# Patient Record
Sex: MALE | Race: WHITE | NOT HISPANIC OR LATINO
[De-identification: names, ages, dates, MRNs, and addresses within clinical notes are randomized per-mention and may not be internally consistent; named-entity substitution may affect disease eponyms.]

---

## 2018-04-30 ENCOUNTER — APPOINTMENT (OUTPATIENT)
Dept: ORTHOPEDIC SURGERY | Facility: CLINIC | Age: 53
End: 2018-04-30
Payer: COMMERCIAL

## 2018-04-30 PROCEDURE — 99214 OFFICE O/P EST MOD 30 MIN: CPT

## 2018-04-30 PROCEDURE — 73564 X-RAY EXAM KNEE 4 OR MORE: CPT | Mod: LT

## 2019-01-23 ENCOUNTER — APPOINTMENT (OUTPATIENT)
Dept: ORTHOPEDIC SURGERY | Facility: CLINIC | Age: 54
End: 2019-01-23
Payer: COMMERCIAL

## 2019-01-23 VITALS — WEIGHT: 196 LBS | BODY MASS INDEX: 27.44 KG/M2 | HEIGHT: 71 IN

## 2019-01-23 PROCEDURE — 99214 OFFICE O/P EST MOD 30 MIN: CPT

## 2019-01-23 PROCEDURE — 73564 X-RAY EXAM KNEE 4 OR MORE: CPT | Mod: RT

## 2019-01-23 RX ORDER — HYALURONATE SODIUM 20 MG/2 ML
20 SYRINGE (ML) INTRAARTICULAR
Qty: 1 | Refills: 0 | Status: ACTIVE | OUTPATIENT
Start: 2019-01-23

## 2019-01-24 NOTE — PHYSICAL EXAM
[de-identified] : Oriented to time, place, person\par Mood: Normal\par Affect: Normal\par  \par Right knee exam\par \par Skin: Clean, dry, intact\par Inspection: No obvious malalignment, no masses, min swelling, no effusion\par Pulses: 2+ DP/PT pulses\par ROM: 0-135 degrees of flexion. No pain with deep knee flexion/extension.\par Tenderness: mild MJLT. No LJLT. Mild pain over the inferior patella facets. No pain to the quadriceps tendon. No pain to the patella tendon. No posterior knee tenderness.\par Stability: Stable to varus, valgus. Negative lachman testing. Negative anterior drawer, negative posterior drawer.\par Strength: 5/5 Q/H/TA/GS/EHL, without atrophy\par Neuro: In tact to light touch throughout, DTR's normal\par Additional tests: Negative McMurrays test, Negative patellar grind test.  [de-identified] : The following radiographs were ordered and read by me during this patients visit. I reviewed each radiograph in detail with the patient and discussed the findings as highlighted below. \par \par 4 views of the right knee were obtained today that show no acute fracture or dislocation. There is mild medial, no lateral and mild patellofemoral degenerative change seen. There is no significant malalignment. No significant other obvious osseous abnormality, otherwise unremarkable. \par \par

## 2019-01-24 NOTE — DISCUSSION/SUMMARY
[de-identified] : 54-year-old male with right knee pain\par \par Patient continues to have degenerative knee pain. Good effect with Visco supplementation. Symptoms mostly localized through the patellofemoral compartment where there is moderate degenerative chondral wear as previously seen on MRI/x-ray. Discussion was had with patient regarding continued activity modification as well as continued trial of HA injection.\par \par Recommendation: Trial of Visco supplementation. We'll obtain preauthorization prior to injection therapy.\par \par Followup: Once approved for injection therapy.\par \par \par **NB: Medication was Issued under circumstances where the provider (Dr. Milton Kenny) reasonably determined that it would be impractical for the patient to obtain substances prescribed by electronic prescription (e-prescribe) given need for pre-authorization, and such delay would adversely impact the patient's medical condition. An exception letter will be mailed to the [facility] during the authorization process.**

## 2019-01-24 NOTE — HISTORY OF PRESENT ILLNESS
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 He was last seen in October 2016 at which time he received viscosupplementation. Injections gave him significant relief for 2 years. He would like to repeat the series if able.  The pain is intermittent brought on twisting movements. He is taking Aleve for pain. MRI obtained in 2015 that showed PF OA and small cystic change to the anterior meniscus.

## 2019-02-04 ENCOUNTER — APPOINTMENT (OUTPATIENT)
Dept: ORTHOPEDIC SURGERY | Facility: CLINIC | Age: 54
End: 2019-02-04
Payer: COMMERCIAL

## 2019-02-04 PROCEDURE — 20610 DRAIN/INJ JOINT/BURSA W/O US: CPT | Mod: RT

## 2019-02-05 NOTE — END OF VISIT
[FreeTextEntry3] : 54-year-old male with right knee osteoarthritis\par \par The first of three Euflexxa injections was given today under sterile conditions into the right knee joint without complication. The patient was instructed on modification of activities over the next 48-72 hours. I advised the patient to ice the knee as needed for control of local irritation from the injection. I advised that some patients have immediate benefit from the initial injection therapy, however it usually takes the medication a number of weeks (~8wks) to provide significant relief of osteoarthritic symptoms. \par \par We will see the patient back for the second injection in a weeks time.

## 2019-02-05 NOTE — PROCEDURE
[de-identified] : Injection: Right knee joint.\par Indication: Osteoarthritis.\par \par A discussion was had with the patient regarding this procedure and all questions were answered. All risks, benefits and alternatives were discussed. These included but were not limited to bleeding, infection, and allergic reaction. Alcohol was used to clean the skin, and betadine was used to sterilize and prep the area in the supero-lateral aspect of the right knee. Ethyl chloride spray was then used as a topical anesthetic. A 21-gauge needle was used to inject 2 cc of Euflexxa into the knee. A sterile bandage was then applied. The patient tolerated the procedure well and there were no complications. \par \par

## 2019-02-11 ENCOUNTER — APPOINTMENT (OUTPATIENT)
Dept: ORTHOPEDIC SURGERY | Facility: CLINIC | Age: 54
End: 2019-02-11
Payer: COMMERCIAL

## 2019-02-11 PROCEDURE — 20610 DRAIN/INJ JOINT/BURSA W/O US: CPT | Mod: RT

## 2019-02-26 ENCOUNTER — APPOINTMENT (OUTPATIENT)
Dept: ORTHOPEDIC SURGERY | Facility: CLINIC | Age: 54
End: 2019-02-26
Payer: COMMERCIAL

## 2019-02-26 PROCEDURE — 20610 DRAIN/INJ JOINT/BURSA W/O US: CPT | Mod: RT

## 2019-02-26 NOTE — PROCEDURE
[de-identified] : Injection & aspiration: Right knee joint.\par Indication: Osteoarthritis.\par \par A discussion was had with the patient regarding this procedure and all questions were answered. All risks, benefits and alternatives were discussed. These included but were not limited to bleeding, infection, and allergic reaction. Alcohol was used to clean the skin, and betadine was used to sterilize and prep the area in the supero-lateral aspect of the right knee. Ethyl chloride spray was then used as a topical anesthetic. An 18-gauge needle was used to inject 2 cc of Euflexxa into the knee and aspirate 15cc inflammatory fluid. A sterile bandage was then applied. The patient tolerated the procedure well and there were no complications. \par \par

## 2019-02-26 NOTE — END OF VISIT
[FreeTextEntry3] : 54-year-old male with right knee osteoarthritis\par \par The final Euflexxa injection was given today under sterile conditions into the right knee joint without complication. I discussed the effects of this medication and how long it may provide benefit. Patient has obtained minimal immediate improvement. If no significant long-term benefit, the patient may elect for additional treatment strategies as previously discussed. However, if the patient obtains good relief of symptoms, the injection therapy series can be repeated over the next 6-12 months.\par \par We'll have the patient followup on an as-needed basis at this time.

## 2019-02-27 NOTE — END OF VISIT
[FreeTextEntry3] : 54-year-old male with right knee osteoarthritis\par \par The second of three Euflexxa injections was given today under sterile conditions into the right knee joint without complication. I again discussed the role of activity modification/icing following the injection to treat any local irritation from the injection. \par \par We'll have the patient followup for the final injection next week.

## 2019-06-20 ENCOUNTER — APPOINTMENT (OUTPATIENT)
Dept: ORTHOPEDIC SURGERY | Facility: CLINIC | Age: 54
End: 2019-06-20
Payer: COMMERCIAL

## 2019-06-20 PROCEDURE — 99213 OFFICE O/P EST LOW 20 MIN: CPT

## 2019-06-24 ENCOUNTER — OTHER (OUTPATIENT)
Age: 54
End: 2019-06-24

## 2019-06-28 NOTE — HISTORY OF PRESENT ILLNESS
[All Other ROS Normal] : All other review of systems are negative except as noted [Joint Pain] : joint pain [Past Hx] : past medical [Fam Hx] : family [Soc Hx] : social [All] : medication and allergy [FreeTextEntry2] : 53 year old male presents today with return of left knee pain x 1 month. He was treated with PT in 2018 which was helpful. HA injections in 2015/2016. He reports that has gotten worse within the past week. The pain is constant worse with knee flexion. Denies locking, catching, buckling, numbness or tingling. Localizes pain to the lateral aspect of the knee.  [FreeTextEntry1] : Left knee pain

## 2019-06-28 NOTE — PHYSICAL EXAM
[Poor Appearance] : well-appearing [Acute Distress] : not in acute distress [Obese] : not obese [FreeTextEntry2] : Left knee exam\par \par Skin: Clean, dry, intact\par Inspection: No obvious malalignment, no masses, no swelling, no effusion\par Pulses: 2+ DP/PT pulses\par ROM: 0-130 degrees of flexion. Min pain with deep knee flexion.\par Tenderness: Mild MJLT. + LJLT. No pain over the patella facets. No pain to the quadriceps tendon. No pain to the patella tendon. No posterior knee tenderness.\par Stability: Stable to varus, valgus. Negative lachman testing. Negative anterior drawer, negative posterior drawer.\par Strength: 5/5 Q/H/TA/GS/EHL, without atrophy\par Neuro: In tact to light touch throughout, DTR's normal\par Additional tests: + McMurrays test, Negative patellar grind test.\par  [Antalgic] : antalgic [de-identified] : 4 views of the left knee were obtained 4.30.18 that show no acute fracture or dislocation. There is min medial, no lateral and min patellofemoral degenerative change seen. There is no significant malalignment. No significant other obvious osseous abnormality, otherwise unremarkable.

## 2019-06-28 NOTE — DISCUSSION/SUMMARY
[de-identified] : 53 year old male presents with left knee pain \par \par Continued pain through the lateral joint, but also with persistent pain medially. Prior treatment has been for degenerative meniscus tear as well as early patellofemoral arthrosis. Considering persistent symptoms, recommendation at this time is for MRI imaging to stratify him for further treatment.\par \par Patient voiced understanding. Follow up after MRI.

## 2020-05-21 ENCOUNTER — APPOINTMENT (OUTPATIENT)
Dept: ORTHOPEDIC SURGERY | Facility: CLINIC | Age: 55
End: 2020-05-21
Payer: COMMERCIAL

## 2020-05-21 VITALS — TEMPERATURE: 96.8 F

## 2020-05-21 PROCEDURE — 73030 X-RAY EXAM OF SHOULDER: CPT | Mod: LT

## 2020-05-21 PROCEDURE — 99214 OFFICE O/P EST MOD 30 MIN: CPT

## 2020-05-21 NOTE — PHYSICAL EXAM
[de-identified] : ·	Oriented to time, place, person\par ·	Mood: Normal\par ·	Affect: Normal\par ·	Appearance: Healthy, well appearing, no acute distress.\par ·	Gait: Normal\par ·	Assistive Devices: None\par \par Left shoulder exam\par \par ·	Inspection: No malalignment, No defects, No atrophy\par ·	Skin: No masses, No lesions\par ·	Neck: Negative Spurlings, full ROM, no pain with ROM\par ·	AROM: FF to 180, abduction to 90, ER to 70, IR to mid lumbar\par ·	PROM: Full \par ·	Painful arc ROM: None\par ·	Tenderness: No bicipital tenderness, no tenderness to the greater tuberosity/RTC insertion, no anterior shoulder/lesser tuberosity tenderness\par ·	Strength: 5/5 ER, 5/5 IR in adduction, 5/5 supraspinatus testing, mild O'Briens test\par ·	AC Joint: No ttp/pain with cross arm testing\par ·	Biceps: Speed Negative, Yergusons Negative\par ·	Impingement test: Positive Reynoso, Positive Neer \par ·	Stability: Stable \par ·	Vasc: 2+ radial pulse\par ·	Neuro: AIN, PIN, Ulnar nerve in tact to motor\par ·	Sensation: In tact to light touch throughout\par  [de-identified] : \par The following radiographs were ordered and read by me during this patients visit. I reviewed each radiograph in detail with the patient and discussed the findings as highlighted below. \par \par 3 views of the left shoulder were obtained today that show no acute fracture or dislocation. There is no glenohumeral and no AC joint degenerative change seen. Type II acromion. There is no significant malalignment. No significant other obvious osseous abnormality, otherwise unremarkable.

## 2020-05-21 NOTE — HISTORY OF PRESENT ILLNESS
[de-identified] : 55 year old male presents today with left shoulder pain x 1 month. No injury reported. The pain is constant worse with overhead motion and internal rotation. Taking Tylenol/ Aleve.  Patient has some difficulty sleeping, and reports weakness in certain positions.  Denies radiation of pain, numbness or tingling. Previously been treated for left knee pain

## 2020-08-12 ENCOUNTER — APPOINTMENT (OUTPATIENT)
Dept: ORTHOPEDIC SURGERY | Facility: CLINIC | Age: 55
End: 2020-08-12
Payer: COMMERCIAL

## 2020-08-12 VITALS — TEMPERATURE: 97.4 F

## 2020-08-12 DIAGNOSIS — M25.512 PAIN IN LEFT SHOULDER: ICD-10-CM

## 2020-08-12 PROCEDURE — 99214 OFFICE O/P EST MOD 30 MIN: CPT

## 2020-08-13 PROBLEM — M25.512 ACUTE PAIN OF LEFT SHOULDER: Status: ACTIVE | Noted: 2020-05-21

## 2020-08-24 ENCOUNTER — NON-APPOINTMENT (OUTPATIENT)
Age: 55
End: 2020-08-24

## 2022-12-15 ENCOUNTER — APPOINTMENT (OUTPATIENT)
Dept: ORTHOPEDIC SURGERY | Facility: CLINIC | Age: 57
End: 2022-12-15
Payer: COMMERCIAL

## 2022-12-15 VITALS
SYSTOLIC BLOOD PRESSURE: 123 MMHG | OXYGEN SATURATION: 98 % | HEIGHT: 70 IN | WEIGHT: 299 LBS | HEART RATE: 78 BPM | BODY MASS INDEX: 42.8 KG/M2 | DIASTOLIC BLOOD PRESSURE: 80 MMHG

## 2022-12-15 DIAGNOSIS — M25.562 PAIN IN LEFT KNEE: ICD-10-CM

## 2022-12-15 PROCEDURE — 99214 OFFICE O/P EST MOD 30 MIN: CPT

## 2022-12-15 PROCEDURE — 73564 X-RAY EXAM KNEE 4 OR MORE: CPT | Mod: LT

## 2022-12-15 RX ORDER — HYALURONATE SODIUM 20 MG/2 ML
20 SYRINGE (ML) INTRAARTICULAR
Qty: 1 | Refills: 0 | Status: ACTIVE | COMMUNITY
Start: 2022-12-15

## 2023-01-03 PROBLEM — M25.562 LEFT KNEE PAIN: Status: ACTIVE | Noted: 2018-05-01

## 2023-01-03 NOTE — HISTORY OF PRESENT ILLNESS
[All Other ROS Normal] : All other review of systems are negative except as noted [Joint Pain] : joint pain [Past Hx] : past medical [Fam Hx] : family [Soc Hx] : social [All] : medication and allergy [FreeTextEntry1] : Left knee pain  [FreeTextEntry2] : 53 year old male presents today with return of left knee pain x  3 weeks. MRI left knee dated 6.26.19 shows no evidence of meniscal injury. Pericruciate ganglion cyst measuring 2cm.  The pain is constant worse with walking. Motrin/ Aleve is minimally helpful.   Denies locking, catching, buckling, numbness or tingling. C/o buckling. He was conservative when he was seen in 2019.   [de-identified] : 57 year old male presents today with return of left knee pain x 3 weeks. MRI left knee in 2019 showed pericruciate ganglion cyst measuring 2cm.  Current pain is constant worse with walking. Motrin/ Aleve is minimally helpful.   Denies locking, catching, buckling, numbness or tingling. C/o buckling. Prior HA injections to the right knee and has continued to have good relief.

## 2023-01-03 NOTE — ADDENDUM
[FreeTextEntry1] : This note was written by Daisy Everett on 12/15/2022 acting solely as a scribe for Dr. Milton Kenny.\par \par All medical record entries made by the Scribe were at my, Dr. Milton Kenny, direction and personally dictated by me on 12/15/2022. I have personally reviewed the chart and agree that the record accurately reflects my personal performance of the history, physical exam, assessment and plan.

## 2023-01-03 NOTE — PHYSICAL EXAM
[Antalgic] : antalgic [Poor Appearance] : well-appearing [Acute Distress] : not in acute distress [Obese] : not obese [FreeTextEntry2] : Left knee exam\par \par Skin: Clean, dry, intact\par Inspection: No obvious malalignment, no masses, no swelling, no effusion\par Pulses: 2+ DP/PT pulses\par ROM: 0-130 degrees of flexion. Min pain with deep knee flexion.\par Tenderness: Mild MJLT. + LJLT. No pain over the patella facets. No pain to the quadriceps tendon. No pain to the patella tendon. No posterior knee tenderness.\par Stability: Stable to varus, valgus. Negative lachman testing. Negative anterior drawer, negative posterior drawer.\par Strength: 5/5 Q/H/TA/GS/EHL, without atrophy\par Neuro: In tact to light touch throughout, DTR's normal\par Additional tests: + McMurrays test, Negative patellar grind test.\par  [de-identified] : Oriented to time, place, person\par Mood: Normal\par Affect: Normal\par Appearance: Healthy, well appearing, no acute distress.\par Gait: Normal\par Assistive Devices: None \par \par Left knee exam\par \par Skin: Clean, dry, intact\par Inspection: No obvious malalignment, no masses, no swelling, no effusion\par Pulses: 2+ DP/PT pulses\par ROM: 0-130 degrees of flexion. Min pain with deep knee flexion.\par Tenderness: + MJLT. no LJLT. No pain over the patella facets. No pain to the quadriceps tendon. No pain to the patella tendon. No posterior knee tenderness.\par Stability: Stable to varus, valgus. Negative lachman testing. Negative anterior drawer, negative posterior drawer.\par Strength: 5/5 Q/H/TA/GS/EHL, without atrophy\par Neuro: In tact to light touch throughout, DTR's normal\par Additional tests: + McMurrays test, Negative patellar grind test. [de-identified] : The following radiographs were ordered and read by me during this patients visit. I reviewed each radiograph in detail with the patient and discussed the findings as highlighted below. \par \par 4 views of the left knee were obtained 12.15.22 that show no acute fracture or dislocation. There is min medial, no lateral and min patellofemoral degenerative change seen. There is no significant malalignment. No significant other obvious osseous abnormality, otherwise unremarkable.\par \par MRI left knee dated 6.26.19 shows no evidence of meniscal injury. Pericruciate ganglion cyst measuring 2cm.

## 2023-01-03 NOTE — DISCUSSION/SUMMARY
[de-identified] : 57 year old male presents with left knee pain \par \par Patient presents with return of left knee pain. Prior treatment has been for evidence of pericruciate ganglion cyst in 2019. We discussed consideration of treatemnt for early DJD. He has had goof relief with viscosupplementation to the right knee and would like to trial on the left knee.\par \par Recommend trial of Visco supplementation injection therapy. We'll obtain preauthorization prior to injection. Patient is not a candidate for intra-articular steroid injection or arthroplasty at this time. \par \par Followup: Once approved for HA injection therapy.

## 2023-01-16 ENCOUNTER — APPOINTMENT (OUTPATIENT)
Dept: ORTHOPEDIC SURGERY | Facility: CLINIC | Age: 58
End: 2023-01-16
Payer: COMMERCIAL

## 2023-01-16 VITALS — WEIGHT: 203 LBS | HEIGHT: 70 IN | BODY MASS INDEX: 29.06 KG/M2

## 2023-01-16 DIAGNOSIS — G89.29 PAIN IN LEFT KNEE: ICD-10-CM

## 2023-01-16 DIAGNOSIS — M25.562 PAIN IN LEFT KNEE: ICD-10-CM

## 2023-01-16 DIAGNOSIS — Z78.9 OTHER SPECIFIED HEALTH STATUS: ICD-10-CM

## 2023-01-16 DIAGNOSIS — M67.40 GANGLION, UNSPECIFIED SITE: ICD-10-CM

## 2023-01-16 PROCEDURE — 99203 OFFICE O/P NEW LOW 30 MIN: CPT

## 2023-01-16 NOTE — DISCUSSION/SUMMARY
[de-identified] : General Dx Discussion\par The patient was advised of the diagnosis. The natural history of the pathology was explained in full to the patient in layman's terms. All questions were answered. The risks and benefits of surgical and non-surgical treatment alternatives were explained in full to the patient.\par \par mri reviewed from 2019\par \par will get a new mri to eval for tear or enlargement of cyst \par f/u after mri \par questions answered

## 2023-01-16 NOTE — REASON FOR VISIT
[FreeTextEntry2] : New Patient:Left knee\par reports pain s/p mri in 2019\par has already seen an orthopedist where xray was done  who has rec. viscosupp.

## 2023-01-16 NOTE — HISTORY OF PRESENT ILLNESS
[8] : 8 [2] : 2 [Dull/Aching] : dull/aching [Sharp] : sharp [Constant] : constant [Household chores] : household chores [Leisure] : leisure [Rest] : rest [Meds] : meds [Walking] : walking [de-identified] : Left knee [] : no [FreeTextEntry1] : Left knee [FreeTextEntry5] : EDGARD COVARRUBIAS is a 58 year old M here today for an evaluation of the left knee. Pt states that he was told by his regular orthopedic doctor that he had bone on bone in his left knee like he's had in his right knee prior. Pt received Euflexxa injections for the right knee in 2019 and has been pain free since; pt was recommended to seek Euflexxa in his left. He was told that his insurance rejected auth for Euflexxa however. Pain varies at rest.  [FreeTextEntry9] : Diclofenac  [de-identified] : Standing from sitting [de-identified] : 12/15/22 [de-identified] : Dr. Kenny

## 2023-01-16 NOTE — PHYSICAL EXAM
[Left] : left knee [NL (0)] : extension 0 degrees [5___] : hamstring 5[unfilled]/5 [Negative] : negative Sole's [] : no calf tenderness [TWNoteComboBox7] : flexion 125 degrees

## 2023-01-17 ENCOUNTER — APPOINTMENT (OUTPATIENT)
Dept: MRI IMAGING | Facility: CLINIC | Age: 58
End: 2023-01-17

## 2023-01-18 ENCOUNTER — FORM ENCOUNTER (OUTPATIENT)
Age: 58
End: 2023-01-18

## 2023-01-19 ENCOUNTER — APPOINTMENT (OUTPATIENT)
Dept: MRI IMAGING | Facility: CLINIC | Age: 58
End: 2023-01-19
Payer: COMMERCIAL

## 2023-01-19 PROCEDURE — 73721 MRI JNT OF LWR EXTRE W/O DYE: CPT | Mod: LT

## 2023-01-23 ENCOUNTER — APPOINTMENT (OUTPATIENT)
Dept: ORTHOPEDIC SURGERY | Facility: CLINIC | Age: 58
End: 2023-01-23
Payer: COMMERCIAL

## 2023-01-23 VITALS — WEIGHT: 203 LBS | HEIGHT: 70 IN | BODY MASS INDEX: 29.06 KG/M2

## 2023-01-23 DIAGNOSIS — M23.92 UNSPECIFIED INTERNAL DERANGEMENT OF LEFT KNEE: ICD-10-CM

## 2023-01-23 PROCEDURE — 99214 OFFICE O/P EST MOD 30 MIN: CPT

## 2023-01-23 NOTE — PHYSICAL EXAM
[Left] : left knee [NL (0)] : extension 0 degrees [5___] : hamstring 5[unfilled]/5 [Positive] : positive Aliya [] : no calf tenderness [TWNoteComboBox7] : flexion 110 degrees

## 2023-01-23 NOTE — HISTORY OF PRESENT ILLNESS
[7] : 7 [4] : 4 [Dull/Aching] : dull/aching [Sharp] : sharp [Intermittent] : intermittent [Household chores] : household chores [Leisure] : leisure [Work] : work [Sleep] : sleep [Walking] : walking [Full time] : Work status: full time [de-identified] : Patient is here for MRI results of his left knee.  [] : no [FreeTextEntry1] : left knee [FreeTextEntry9] : voltaren gel [de-identified] : getting up from a sitting position [de-identified] : none

## 2023-01-23 NOTE — DATA REVIEWED
[MRI] : MRI [Left] : left [Report was reviewed and noted in the chart] : The report was reviewed and noted in the chart [I reviewed the films/CD] : I reviewed the films/CD [FreeTextEntry1] : mm tear / OA

## 2023-01-23 NOTE — DISCUSSION/SUMMARY
[de-identified] : General Dx Discussion\par The patient was advised of the diagnosis. The natural history of the pathology was explained in full to the patient in layman's terms. All questions were answered. The risks and benefits of surgical and non-surgical treatment alternatives were explained in full to the patient.\par \par Case Discussed.\par MRI reviewed, has occ. catching with pain and loss of motion \par Arthroscopy with partial medial / lateral; meniscectomy and debridement / synovectomy discussed\par Risks, benefits and alternatives discussed. Risks include, but are not limited to infection, blood clot, nerve damage, recurrent tear, loss of motion, continued pain, worsened pain, need for another surgery in the future.\par Discussed that the surgery will not address any pain that he has from any OA he may have. He understands he will not be 100% better after surgery.\par Questions answered.\par He expressed understanding and would like to proceed.\par Will also get auth for euflexxa lt knee if not auth by his insurance, will get Visco3 via [facility]. \par \par The patient has two pathologic conditions at this point.  There is a meniscus tear which is causing symptomology and there is also underlying osteoarthritis.  The patient was counseled that surgical intervention to address the torn meniscus will not change the symptomology attributable to the arthritis.  The patient was advised that the pain attributable to the meniscus tear should be resolved arthroscopically.  However, the patient should expect to have continued aches and pains related to the arthritis, in the form of, but not limited to pain, weather related changes, dull ache, crepitation, limitation of motion and strength and the need for further surgeries. The patient understands that the arthroscopic procedure addresses the meniscus only and that after surgery, the knee will not be 100% but it should be improved with respect to the symptomology attributed to the torn meniscus.  Patient also is aware that further treatment after arthroscopy may be necessary in the form of oral medications, cortisone and/or viscosupplementation injections, and further surgeries including knee replacement.  The patient agrees, understands and wishes to proceed.\par \par The patient was advised of the diagnosis.  The natural history of the pathology was explained in full to the patient in layman's terms. All questions were answered.  The risks and benefits of surgical and non-surgical treatment alternatives were explained in full to the patient.  \par The patient demonstrated a full understanding of the surgical and non-surgical options.  The risks of surgery were outlined in full to the patient including but not limited to bleeding, scarring, infection, sepsis, neurologic injury, vascular injury, failure to resolve symptoms, symptom recurrence, the need for further surgery, non-healing, wound breakdown, deep vein thrombosis, pulmonary embolism, spontaneous osteonecrosis, anesthesia complications and even death.  The patient understood all the risks and accepted them and understood that other complications could occur that are not mentioned above.  The intraoperative plan, post-operative plan, post-operative expectations and limitations were explained in full.  Expectations from non-surgical treatment were explained in full as well.  The patient demonstrated a complete understanding of the treatment alternatives and requested the above-mentioned procedure.  This will be scheduled accordingly.\par \par \par \par

## 2023-01-26 ENCOUNTER — APPOINTMENT (OUTPATIENT)
Dept: ORTHOPEDIC SURGERY | Facility: CLINIC | Age: 58
End: 2023-01-26
Payer: COMMERCIAL

## 2023-01-26 VITALS — WEIGHT: 203 LBS | BODY MASS INDEX: 29.06 KG/M2 | HEIGHT: 70 IN

## 2023-01-26 PROCEDURE — 20611 DRAIN/INJ JOINT/BURSA W/US: CPT

## 2023-01-26 PROCEDURE — 99212 OFFICE O/P EST SF 10 MIN: CPT | Mod: 25

## 2023-01-26 NOTE — DISCUSSION/SUMMARY
[de-identified] : General Dx Discussion\par The patient was advised of the diagnosis. The natural history of the pathology was explained in full to the patient in layman's terms. All questions were answered. The risks and benefits of surgical and non-surgical treatment alternatives were explained in full to the patient.\par \par Case Discussed.\par Injection tolerated well. \par f/u 1 week to continue series. \par \par Entered by Kiki TILLMAN acting as a scribe.\par Instructions: Dr. Pinzon- The documentation recorded by the scribe accurately reflects the service I personally performed and the decisions made by me.\par \par \par \par \par

## 2023-01-26 NOTE — PROCEDURE
[Large Joint Injection] : Large joint injection [Left] : of the left [Knee] : knee [Pain] : pain [Inflammation] : inflammation [X-ray evidence of Osteoarthritis on this or prior visit] : x-ray evidence of Osteoarthritis on this or prior visit [Alcohol] : alcohol [Betadine] : betadine [Ethyl Chloride sprayed topically] : ethyl chloride sprayed topically [Sterile technique used] : sterile technique used [Visco-3 (25mg)] : 25mg of Visco-3 [] : Patient tolerated procedure well [#1] : series #1 [Call if redness, pain or fever occur] : call if redness, pain or fever occur [Apply ice for 15min out of every hour for the next 12-24 hours as tolerated] : apply ice for 15 minutes out of every hour for the next 12-24 hours as tolerated [Previous OTC use and PT nontherapeutic] : patient has tried OTC's including aspirin, Ibuprofen, Aleve, etc or prescription NSAIDS, and/or exercises at home and/or physical therapy without satisfactory response [Patient had decreased mobility in the joint] : patient had decreased mobility in the joint [Risks, benefits, alternatives discussed / Verbal consent obtained] : the risks benefits, and alternatives have been discussed, and verbal consent was obtained [Prior failure or difficult injection] : prior failure or difficult injection [Altered anatomic landmarks d/t erosive arthritis] : altered anatomic landmarks d/t erosive arthritis [All ultrasound images have been permanently captured and stored accordingly in our picture archiving and communication system] : All ultrasound images have been permanently captured and stored accordingly in our picture archiving and communication system

## 2023-01-26 NOTE — PHYSICAL EXAM
[Left] : left knee [NL (0)] : extension 0 degrees [5___] : hamstring 5[unfilled]/5 [Positive] : positive Aliya [] : patient ambulates without assistive device [TWNoteComboBox7] : flexion 110 degrees

## 2023-02-02 ENCOUNTER — APPOINTMENT (OUTPATIENT)
Dept: ORTHOPEDIC SURGERY | Facility: CLINIC | Age: 58
End: 2023-02-02
Payer: COMMERCIAL

## 2023-02-02 VITALS — BODY MASS INDEX: 28.92 KG/M2 | HEIGHT: 70 IN | WEIGHT: 202 LBS

## 2023-02-02 PROCEDURE — 20611 DRAIN/INJ JOINT/BURSA W/US: CPT

## 2023-02-02 PROCEDURE — 99212 OFFICE O/P EST SF 10 MIN: CPT | Mod: 25

## 2023-02-02 NOTE — DISCUSSION/SUMMARY
[de-identified] : General Dx Discussion\par The patient was advised of the diagnosis. The natural history of the pathology was explained in full to the patient in layman's terms. All questions were answered. The risks and benefits of surgical and non-surgical treatment alternatives were explained in full to the patient.\par \par Case Discussed.\par Injection tolerated well. \par f/u 1 week to continue series. \par \par Entered by Kiki TILLMAN acting as a scribe.\par Instructions: Dr. Pinzon- The documentation recorded by the scribe accurately reflects the service I personally performed and the decisions made by me.\par \par \par \par \par

## 2023-02-02 NOTE — HISTORY OF PRESENT ILLNESS
[2] : 2 [Other:____] : [unfilled] [de-identified] : #2 visco-3 left knee [] : no [de-identified] : 1/26/23 [TWNoteComboBox1] : 10%

## 2023-02-02 NOTE — PROCEDURE
[Large Joint Injection] : Large joint injection [Left] : of the left [Knee] : knee [Pain] : pain [Inflammation] : inflammation [X-ray evidence of Osteoarthritis on this or prior visit] : x-ray evidence of Osteoarthritis on this or prior visit [Alcohol] : alcohol [Betadine] : betadine [Ethyl Chloride sprayed topically] : ethyl chloride sprayed topically [Sterile technique used] : sterile technique used [Visco-3 (25mg)] : 25mg of Visco-3 [#2] : series #2 [] : Patient tolerated procedure well [Call if redness, pain or fever occur] : call if redness, pain or fever occur [Apply ice for 15min out of every hour for the next 12-24 hours as tolerated] : apply ice for 15 minutes out of every hour for the next 12-24 hours as tolerated [Previous OTC use and PT nontherapeutic] : patient has tried OTC's including aspirin, Ibuprofen, Aleve, etc or prescription NSAIDS, and/or exercises at home and/or physical therapy without satisfactory response [Patient had decreased mobility in the joint] : patient had decreased mobility in the joint [Risks, benefits, alternatives discussed / Verbal consent obtained] : the risks benefits, and alternatives have been discussed, and verbal consent was obtained [Prior failure or difficult injection] : prior failure or difficult injection [Altered anatomic landmarks d/t erosive arthritis] : altered anatomic landmarks d/t erosive arthritis [All ultrasound images have been permanently captured and stored accordingly in our picture archiving and communication system] : All ultrasound images have been permanently captured and stored accordingly in our picture archiving and communication system

## 2023-02-09 ENCOUNTER — APPOINTMENT (OUTPATIENT)
Dept: ORTHOPEDIC SURGERY | Facility: CLINIC | Age: 58
End: 2023-02-09
Payer: COMMERCIAL

## 2023-02-09 VITALS — BODY MASS INDEX: 28.92 KG/M2 | HEIGHT: 70 IN | WEIGHT: 202 LBS

## 2023-02-09 PROCEDURE — 20611 DRAIN/INJ JOINT/BURSA W/US: CPT

## 2023-02-09 PROCEDURE — 99212 OFFICE O/P EST SF 10 MIN: CPT | Mod: 25

## 2023-02-09 NOTE — PROCEDURE
[Large Joint Injection] : Large joint injection [Left] : of the left [Knee] : knee [Pain] : pain [Inflammation] : inflammation [X-ray evidence of Osteoarthritis on this or prior visit] : x-ray evidence of Osteoarthritis on this or prior visit [Alcohol] : alcohol [Betadine] : betadine [Ethyl Chloride sprayed topically] : ethyl chloride sprayed topically [Sterile technique used] : sterile technique used [Visco-3 (25mg)] : 25mg of Visco-3 [#3] : series #3 [] : Patient tolerated procedure well [Call if redness, pain or fever occur] : call if redness, pain or fever occur [Apply ice for 15min out of every hour for the next 12-24 hours as tolerated] : apply ice for 15 minutes out of every hour for the next 12-24 hours as tolerated [Previous OTC use and PT nontherapeutic] : patient has tried OTC's including aspirin, Ibuprofen, Aleve, etc or prescription NSAIDS, and/or exercises at home and/or physical therapy without satisfactory response [Patient had decreased mobility in the joint] : patient had decreased mobility in the joint [Risks, benefits, alternatives discussed / Verbal consent obtained] : the risks benefits, and alternatives have been discussed, and verbal consent was obtained [Prior failure or difficult injection] : prior failure or difficult injection [Altered anatomic landmarks d/t erosive arthritis] : altered anatomic landmarks d/t erosive arthritis [All ultrasound images have been permanently captured and stored accordingly in our picture archiving and communication system] : All ultrasound images have been permanently captured and stored accordingly in our picture archiving and communication system

## 2023-02-09 NOTE — HISTORY OF PRESENT ILLNESS
[3] : 3 [Other:____] : [unfilled] [de-identified] : #3 visco-3 left knee [] : no [FreeTextEntry1] : Left knee [de-identified] : 2/2/23 [TWNoteComboBox1] : 90%

## 2023-02-09 NOTE — DISCUSSION/SUMMARY
[de-identified] : General Dx Discussion\par The patient was advised of the diagnosis. The natural history of the pathology was explained in full to the patient in layman's terms. All questions were answered. The risks and benefits of surgical and non-surgical treatment alternatives were explained in full to the patient.\par \par Case Discussed.\par Injection tolerated well. \par \par \par Entered by Kiki TILLMAN acting as a scribe.\par Instructions: Dr. Pinzon- The documentation recorded by the scribe accurately reflects the service I personally performed and the decisions made by me.\par \par \par \par \par

## 2023-03-20 ENCOUNTER — APPOINTMENT (OUTPATIENT)
Dept: ORTHOPEDIC SURGERY | Facility: CLINIC | Age: 58
End: 2023-03-20
Payer: COMMERCIAL

## 2023-03-20 VITALS — HEIGHT: 70 IN | BODY MASS INDEX: 28.92 KG/M2 | WEIGHT: 202 LBS

## 2023-03-20 PROCEDURE — 99213 OFFICE O/P EST LOW 20 MIN: CPT

## 2023-03-20 NOTE — REASON FOR VISIT
[FreeTextEntry2] : follow up- left knee feels better after injections but still has pain on the inside of his knee

## 2023-03-20 NOTE — PHYSICAL EXAM
[Left] : left knee [NL (0)] : extension 0 degrees [5___] : hamstring 5[unfilled]/5 [Equivocal] : equivocal Aliya [] : patient ambulates without assistive device [TWNoteComboBox7] : flexion 110 degrees

## 2023-03-20 NOTE — DISCUSSION/SUMMARY
[de-identified] : General Dx Discussion\par The patient was advised of the diagnosis. The natural history of the pathology was explained in full to the patient in layman's terms. All questions were answered. The risks and benefits of surgical and non-surgical treatment alternatives were explained in full to the patient.\par \par Feels 50% better \par advised of surg/non surg options \par he is going to monitor \par f/u 3 months

## 2023-03-20 NOTE — HISTORY OF PRESENT ILLNESS
[FreeTextEntry5] : EDGARD is a 58 year old M who is here today for follow up on left knee. Last visit she completed visco series.  [de-identified] : PT

## 2023-03-23 ENCOUNTER — APPOINTMENT (OUTPATIENT)
Dept: ORTHOPEDIC SURGERY | Facility: CLINIC | Age: 58
End: 2023-03-23

## 2023-07-17 ENCOUNTER — APPOINTMENT (OUTPATIENT)
Dept: ORTHOPEDIC SURGERY | Facility: CLINIC | Age: 58
End: 2023-07-17
Payer: COMMERCIAL

## 2023-07-17 VITALS — BODY MASS INDEX: 28.63 KG/M2 | HEIGHT: 70 IN | WEIGHT: 200 LBS

## 2023-07-17 DIAGNOSIS — S83.242D OTHER TEAR OF MEDIAL MENISCUS, CURRENT INJURY, LEFT KNEE, SUBSEQUENT ENCOUNTER: ICD-10-CM

## 2023-07-17 PROCEDURE — 99213 OFFICE O/P EST LOW 20 MIN: CPT

## 2023-07-17 NOTE — HISTORY OF PRESENT ILLNESS
[2] : 2 [5] : 5 [Dull/Aching] : dull/aching [Localized] : localized [Sharp] : sharp [Heat] : heat [Standing] : standing [Walking] : walking [de-identified] : 58 year old male is here for a follow up on the L knee. Patient states pain is inconsistent. Patient was going to physical therapy but now does HEP at home.  [] : no [FreeTextEntry1] : L knee [FreeTextEntry9] : HEP, Aleve when needed [de-identified] : Long period of time, moving certain positions. [de-identified] : MRI, xray [de-identified] : HEP

## 2023-07-17 NOTE — DISCUSSION/SUMMARY
[de-identified] : General Dx Discussion\par The patient was advised of the diagnosis. The natural history of the pathology was explained in full to the patient in layman's terms. All questions were answered. The risks and benefits of surgical and non-surgical treatment alternatives were explained in full to the patient.\par \par IS STABLE WILL F/U PRN

## 2023-07-17 NOTE — PHYSICAL EXAM
[Left] : left knee [NL (0)] : extension 0 degrees [5___] : hamstring 5[unfilled]/5 [Equivocal] : equivocal Aliya [] : no pain with varus stress [TWNoteComboBox7] : flexion 110 degrees

## 2023-10-23 ENCOUNTER — NON-APPOINTMENT (OUTPATIENT)
Age: 58
End: 2023-10-23

## 2023-10-23 ENCOUNTER — TRANSCRIPTION ENCOUNTER (OUTPATIENT)
Age: 58
End: 2023-10-23

## 2023-10-23 ENCOUNTER — APPOINTMENT (OUTPATIENT)
Dept: ORTHOPEDIC SURGERY | Facility: CLINIC | Age: 58
End: 2023-10-23
Payer: COMMERCIAL

## 2023-10-23 VITALS — HEIGHT: 71 IN | WEIGHT: 196 LBS | BODY MASS INDEX: 27.44 KG/M2

## 2023-10-23 DIAGNOSIS — N40.0 BENIGN PROSTATIC HYPERPLASIA WITHOUT LOWER URINARY TRACT SYMPMS: ICD-10-CM

## 2023-10-23 PROCEDURE — 72100 X-RAY EXAM L-S SPINE 2/3 VWS: CPT

## 2023-10-23 PROCEDURE — 99214 OFFICE O/P EST MOD 30 MIN: CPT

## 2023-10-23 PROCEDURE — 72170 X-RAY EXAM OF PELVIS: CPT

## 2023-10-23 PROCEDURE — 73564 X-RAY EXAM KNEE 4 OR MORE: CPT | Mod: RT

## 2023-10-23 RX ORDER — TADALAFIL 2.5 MG/1
TABLET, FILM COATED ORAL
Refills: 0 | Status: ACTIVE | COMMUNITY

## 2023-10-23 RX ORDER — HYALURONATE SODIUM 20 MG/2 ML
20 SYRINGE (ML) INTRAARTICULAR
Qty: 6 | Refills: 0 | Status: COMPLETED | COMMUNITY
Start: 2023-10-23 | End: 2023-11-13

## 2023-10-26 ENCOUNTER — APPOINTMENT (OUTPATIENT)
Dept: MRI IMAGING | Facility: CLINIC | Age: 58
End: 2023-10-26
Payer: COMMERCIAL

## 2023-10-26 PROCEDURE — 72148 MRI LUMBAR SPINE W/O DYE: CPT

## 2023-11-02 ENCOUNTER — APPOINTMENT (OUTPATIENT)
Dept: ORTHOPEDIC SURGERY | Facility: CLINIC | Age: 58
End: 2023-11-02
Payer: COMMERCIAL

## 2023-11-02 ENCOUNTER — NON-APPOINTMENT (OUTPATIENT)
Age: 58
End: 2023-11-02

## 2023-11-02 VITALS — WEIGHT: 196 LBS | BODY MASS INDEX: 27.44 KG/M2 | HEIGHT: 71 IN

## 2023-11-02 DIAGNOSIS — M54.16 RADICULOPATHY, LUMBAR REGION: ICD-10-CM

## 2023-11-02 DIAGNOSIS — M51.36 OTHER INTERVERTEBRAL DISC DEGENERATION, LUMBAR REGION: ICD-10-CM

## 2023-11-02 DIAGNOSIS — M51.27 OTHER INTERVERTEBRAL DISC DISPLACEMENT, LUMBOSACRAL REGION: ICD-10-CM

## 2023-11-02 PROCEDURE — 99213 OFFICE O/P EST LOW 20 MIN: CPT

## 2023-11-13 ENCOUNTER — APPOINTMENT (OUTPATIENT)
Dept: ORTHOPEDIC SURGERY | Facility: CLINIC | Age: 58
End: 2023-11-13
Payer: COMMERCIAL

## 2023-11-13 VITALS — BODY MASS INDEX: 27.44 KG/M2 | WEIGHT: 196 LBS | HEIGHT: 71 IN

## 2023-11-13 DIAGNOSIS — Z63.5 DISRUPTION OF FAMILY BY SEPARATION AND DIVORCE: ICD-10-CM

## 2023-11-13 PROCEDURE — 99213 OFFICE O/P EST LOW 20 MIN: CPT | Mod: 25

## 2023-11-13 PROCEDURE — 20611 DRAIN/INJ JOINT/BURSA W/US: CPT | Mod: LT

## 2023-11-13 SDOH — SOCIAL STABILITY - SOCIAL INSECURITY: DISRUPTION OF FAMILY BY SEPARATION AND DIVORCE: Z63.5

## 2023-11-20 ENCOUNTER — APPOINTMENT (OUTPATIENT)
Dept: ORTHOPEDIC SURGERY | Facility: CLINIC | Age: 58
End: 2023-11-20
Payer: COMMERCIAL

## 2023-11-20 VITALS — BODY MASS INDEX: 27.44 KG/M2 | WEIGHT: 196 LBS | HEIGHT: 71 IN

## 2023-11-20 PROCEDURE — 99212 OFFICE O/P EST SF 10 MIN: CPT | Mod: 25

## 2023-11-20 PROCEDURE — 20611 DRAIN/INJ JOINT/BURSA W/US: CPT | Mod: LT

## 2023-11-27 ENCOUNTER — APPOINTMENT (OUTPATIENT)
Dept: ORTHOPEDIC SURGERY | Facility: CLINIC | Age: 58
End: 2023-11-27

## 2023-12-04 ENCOUNTER — APPOINTMENT (OUTPATIENT)
Dept: ORTHOPEDIC SURGERY | Facility: CLINIC | Age: 58
End: 2023-12-04

## 2023-12-12 ENCOUNTER — APPOINTMENT (OUTPATIENT)
Dept: PAIN MANAGEMENT | Facility: CLINIC | Age: 58
End: 2023-12-12

## 2023-12-13 ENCOUNTER — APPOINTMENT (OUTPATIENT)
Dept: ORTHOPEDIC SURGERY | Facility: CLINIC | Age: 58
End: 2023-12-13
Payer: COMMERCIAL

## 2023-12-13 PROCEDURE — 20611 DRAIN/INJ JOINT/BURSA W/US: CPT | Mod: LT

## 2023-12-13 PROCEDURE — 99212 OFFICE O/P EST SF 10 MIN: CPT | Mod: 25

## 2023-12-13 NOTE — DISCUSSION/SUMMARY
[de-identified] : General Dx discussion The patient was advised of the diagnosis. The natural history of the pathology was explained in full to the patient in layman's terms. All questions were answered. The risks and benefits of surgical and non-surgical treatment alternatives were explained in full to the patient.   Case Discussed. Visco-3 tolerated well.  Follow up as needed for lt knee.  Reports visco3 has helped wants to start for his rt knee.   Entered by LAYNE Tran acting as scribe. - The documentation recorded by the scribe accurately reflects the service I personally performed and the decisions made by me.

## 2023-12-13 NOTE — PROCEDURE
[Large Joint Injection] : Large joint injection [Left] : of the left [Knee] : knee [Pain] : pain [Inflammation] : inflammation [X-ray evidence of Osteoarthritis on this or prior visit] : x-ray evidence of Osteoarthritis on this or prior visit [Alcohol] : alcohol [Betadine] : betadine [Ethyl Chloride sprayed topically] : ethyl chloride sprayed topically [Sterile technique used] : sterile technique used [Visco-3 (25mg)] : 25mg of Visco-3 [#3] : series #3 [] : Patient tolerated procedure well [Call if redness, pain or fever occur] : call if redness, pain or fever occur [Apply ice for 15min out of every hour for the next 12-24 hours as tolerated] : apply ice for 15 minutes out of every hour for the next 12-24 hours as tolerated [Previous OTC use and PT nontherapeutic] : patient has tried OTC's including aspirin, Ibuprofen, Aleve, etc or prescription NSAIDS, and/or exercises at home and/or physical therapy without satisfactory response [Patient had decreased mobility in the joint] : patient had decreased mobility in the joint [Risks, benefits, alternatives discussed / Verbal consent obtained] : the risks benefits, and alternatives have been discussed, and verbal consent was obtained [Prior failure or difficult injection] : prior failure or difficult injection [All ultrasound images have been permanently captured and stored accordingly in our picture archiving and communication system] : All ultrasound images have been permanently captured and stored accordingly in our picture archiving and communication system

## 2023-12-13 NOTE — PHYSICAL EXAM
[Left] : left knee [5___] : hamstring 5[unfilled]/5 [Negative] : negative Sole's [] : no pain with varus stress [TWNoteComboBox7] : flexion 115 degrees [de-identified] : extension 0 degrees

## 2024-02-05 ENCOUNTER — APPOINTMENT (OUTPATIENT)
Dept: ORTHOPEDIC SURGERY | Facility: CLINIC | Age: 59
End: 2024-02-05
Payer: COMMERCIAL

## 2024-02-05 VITALS — WEIGHT: 198 LBS | HEIGHT: 71 IN | BODY MASS INDEX: 27.72 KG/M2

## 2024-02-05 PROCEDURE — 20611 DRAIN/INJ JOINT/BURSA W/US: CPT | Mod: RT

## 2024-02-05 NOTE — PHYSICAL EXAM
[Left] : left knee [5___] : hamstring 5[unfilled]/5 [Negative] : negative Sole's [] : no pain with varus stress [TWNoteComboBox7] : flexion 115 degrees [de-identified] : extension 0 degrees

## 2024-02-05 NOTE — HISTORY OF PRESENT ILLNESS
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

## 2024-02-05 NOTE — DISCUSSION/SUMMARY
[de-identified] : General Dx discussion The patient was advised of the diagnosis. The natural history of the pathology was explained in full to the patient in layman's terms. All questions were answered. The risks and benefits of surgical and non-surgical treatment alternatives were explained in full to the patient.   Case Discussed. Visco-3 tolerated well.  Follow up in 1 week to continue series.  Entered by LAYNE Riggs acting as scribe. - The documentation recorded by the scribe accurately reflects the service I personally performed and the decisions made by me.

## 2024-02-05 NOTE — PROCEDURE
[Large Joint Injection] : Large joint injection [Right] : of the right [Knee] : knee [Pain] : pain [Inflammation] : inflammation [X-ray evidence of Osteoarthritis on this or prior visit] : x-ray evidence of Osteoarthritis on this or prior visit [Alcohol] : alcohol [Betadine] : betadine [Ethyl Chloride sprayed topically] : ethyl chloride sprayed topically [Sterile technique used] : sterile technique used [Visco-3 (25mg)] : 25mg of Visco-3 [#1] : series #1 [] : Patient tolerated procedure well [Call if redness, pain or fever occur] : call if redness, pain or fever occur [Apply ice for 15min out of every hour for the next 12-24 hours as tolerated] : apply ice for 15 minutes out of every hour for the next 12-24 hours as tolerated [Previous OTC use and PT nontherapeutic] : patient has tried OTC's including aspirin, Ibuprofen, Aleve, etc or prescription NSAIDS, and/or exercises at home and/or physical therapy without satisfactory response [Patient had decreased mobility in the joint] : patient had decreased mobility in the joint [Risks, benefits, alternatives discussed / Verbal consent obtained] : the risks benefits, and alternatives have been discussed, and verbal consent was obtained [Prior failure or difficult injection] : prior failure or difficult injection [All ultrasound images have been permanently captured and stored accordingly in our picture archiving and communication system] : All ultrasound images have been permanently captured and stored accordingly in our picture archiving and communication system

## 2024-02-12 ENCOUNTER — APPOINTMENT (OUTPATIENT)
Dept: ORTHOPEDIC SURGERY | Facility: CLINIC | Age: 59
End: 2024-02-12
Payer: COMMERCIAL

## 2024-02-12 VITALS — HEIGHT: 71 IN | BODY MASS INDEX: 27.72 KG/M2 | WEIGHT: 198 LBS

## 2024-02-12 PROCEDURE — 20611 DRAIN/INJ JOINT/BURSA W/US: CPT | Mod: RT

## 2024-02-12 NOTE — DISCUSSION/SUMMARY
[de-identified] : General Dx discussion The patient was advised of the diagnosis. The natural history of the pathology was explained in full to the patient in layman's terms. All questions were answered. The risks and benefits of surgical and non-surgical treatment alternatives were explained in full to the patient.   Case Discussed. Visco-3 tolerated well.  Follow up in 1 week to continue the series.  Entered by LAYNE Tran acting as scribe. - The documentation recorded by the scribe accurately reflects the service I personally performed and the decisions made by me.

## 2024-02-12 NOTE — HISTORY OF PRESENT ILLNESS
[2] : 2 [Other:____] : [unfilled] [de-identified] : #2 visco-3 right knee [] : no [de-identified] : 02/05/24

## 2024-02-12 NOTE — PHYSICAL EXAM
[Left] : left knee [5___] : hamstring 5[unfilled]/5 [Negative] : negative Sole's [] : no pain with varus stress [TWNoteComboBox7] : flexion 115 degrees [de-identified] : extension 0 degrees

## 2024-02-12 NOTE — PROCEDURE
[Large Joint Injection] : Large joint injection [Right] : of the right [Knee] : knee [Pain] : pain [Inflammation] : inflammation [X-ray evidence of Osteoarthritis on this or prior visit] : x-ray evidence of Osteoarthritis on this or prior visit [Alcohol] : alcohol [Betadine] : betadine [Ethyl Chloride sprayed topically] : ethyl chloride sprayed topically [Sterile technique used] : sterile technique used [Visco-3 (25mg)] : 25mg of Visco-3 [#2] : series #2 [] : Patient tolerated procedure well [Call if redness, pain or fever occur] : call if redness, pain or fever occur [Apply ice for 15min out of every hour for the next 12-24 hours as tolerated] : apply ice for 15 minutes out of every hour for the next 12-24 hours as tolerated [Previous OTC use and PT nontherapeutic] : patient has tried OTC's including aspirin, Ibuprofen, Aleve, etc or prescription NSAIDS, and/or exercises at home and/or physical therapy without satisfactory response [Patient had decreased mobility in the joint] : patient had decreased mobility in the joint [Risks, benefits, alternatives discussed / Verbal consent obtained] : the risks benefits, and alternatives have been discussed, and verbal consent was obtained [Prior failure or difficult injection] : prior failure or difficult injection [All ultrasound images have been permanently captured and stored accordingly in our picture archiving and communication system] : All ultrasound images have been permanently captured and stored accordingly in our picture archiving and communication system

## 2024-02-19 ENCOUNTER — APPOINTMENT (OUTPATIENT)
Dept: ORTHOPEDIC SURGERY | Facility: CLINIC | Age: 59
End: 2024-02-19
Payer: COMMERCIAL

## 2024-02-19 VITALS — WEIGHT: 198 LBS | BODY MASS INDEX: 27.72 KG/M2 | HEIGHT: 71 IN

## 2024-02-19 PROCEDURE — 20610 DRAIN/INJ JOINT/BURSA W/O US: CPT | Mod: RT

## 2024-02-19 NOTE — PROCEDURE
[Large Joint Injection] : Large joint injection [Right] : of the right [Knee] : knee [Pain] : pain [Inflammation] : inflammation [X-ray evidence of Osteoarthritis on this or prior visit] : x-ray evidence of Osteoarthritis on this or prior visit [Alcohol] : alcohol [Betadine] : betadine [Ethyl Chloride sprayed topically] : ethyl chloride sprayed topically [Sterile technique used] : sterile technique used [Visco-3 (25mg)] : 25mg of Visco-3 [#3] : series #3 [] : Patient tolerated procedure well [Call if redness, pain or fever occur] : call if redness, pain or fever occur [Apply ice for 15min out of every hour for the next 12-24 hours as tolerated] : apply ice for 15 minutes out of every hour for the next 12-24 hours as tolerated [Previous OTC use and PT nontherapeutic] : patient has tried OTC's including aspirin, Ibuprofen, Aleve, etc or prescription NSAIDS, and/or exercises at home and/or physical therapy without satisfactory response [Patient had decreased mobility in the joint] : patient had decreased mobility in the joint [Risks, benefits, alternatives discussed / Verbal consent obtained] : the risks benefits, and alternatives have been discussed, and verbal consent was obtained [Prior failure or difficult injection] : prior failure or difficult injection [All ultrasound images have been permanently captured and stored accordingly in our picture archiving and communication system] : All ultrasound images have been permanently captured and stored accordingly in our picture archiving and communication system

## 2024-02-19 NOTE — PHYSICAL EXAM
Expiration Date (Month Year): 11/30/2022 [Left] : left knee [5___] : hamstring 5[unfilled]/5 [Negative] : negative Sole's [] : no pain with varus stress [TWNoteComboBox7] : flexion 115 degrees [de-identified] : extension 0 degrees

## 2024-02-19 NOTE — DISCUSSION/SUMMARY
[de-identified] : General Dx discussion The patient was advised of the diagnosis. The natural history of the pathology was explained in full to the patient in layman's terms. All questions were answered. The risks and benefits of surgical and non-surgical treatment alternatives were explained in full to the patient.   Case Discussed. Visco-3 tolerated well.  Follow up in PRN.  Entered by LAYNE Solano acting as scribe. - The documentation recorded by the scribe accurately reflects the service I personally performed and the decisions made by me.

## 2024-02-19 NOTE — HISTORY OF PRESENT ILLNESS
[3] : 3 [Other:____] : [unfilled] [de-identified] : Patient presents for #3 right knee visco-3. [] : no [de-identified] : 2/12/24 [TWNoteComboBox1] : 10%

## 2024-02-29 ENCOUNTER — APPOINTMENT (OUTPATIENT)
Dept: ORTHOPEDIC SURGERY | Facility: CLINIC | Age: 59
End: 2024-02-29
Payer: COMMERCIAL

## 2024-02-29 VITALS — BODY MASS INDEX: 27.86 KG/M2 | HEIGHT: 71 IN | WEIGHT: 199 LBS

## 2024-02-29 PROCEDURE — 99213 OFFICE O/P EST LOW 20 MIN: CPT

## 2024-03-02 NOTE — ADDENDUM
[FreeTextEntry1] : This note was written by Daisy Everett on 02/29/2024 acting solely as a scribe for Dr. Milton Kenny.  All medical record entries made by the Scribe were at my, Dr. Milton Kenny, direction and personally dictated by me on 02/29/2024. I have personally reviewed the chart and agree that the record accurately reflects my personal performance of the history, physical exam, assessment and plan.

## 2024-03-02 NOTE — HISTORY OF PRESENT ILLNESS
[de-identified] : 59 year old male presents today with chronic bilateral knee pain. Patient has been under the care of Dr. Pinzon for bilateral knee OA with Vsco3 without relief, right knee was in February and the left knee was in October 2023. He had prior success with Euflexxa in Right knee in 2019 providing him relief for a few years. The pain is intermittent present at night  and walking. Diclofenac is helpful. He is here to possible restart Euflexxa.

## 2024-03-02 NOTE — DISCUSSION/SUMMARY
[de-identified] : 58 y/o male with bilateral knee osteoarthritis.   Patient presents for evaluation of bilateral knee OA, significant recent progression and now having poor response to Visco-3. Considering Euflexxa. Concern for progression of disease and discussed consideration of possible arthroplasty considerations. Patient looking to avoid surgery.   Recommend trial of Visco supplementation injection LEFT therapy. We'll obtain preauthorization prior to injection. Patient is not a candidate for alternative intra-articular steroid injection.  Followup: Once approved for repeat trial HA injection therapy.

## 2024-03-02 NOTE — PHYSICAL EXAM
[de-identified] : Right Knee Exam:   Skin: Clean, dry, intact Inspection: No obvious malalignment, no masses, + swelling, no effusion Pulses: 2+ DP/PT pulses ROM: 0-130 degrees of flexion. No pain with deep knee flexion/extension. Tenderness: +MJLT. No LJLT. No pain over the patella facets. No pain to the quadriceps tendon. No pain to the patella tendon. No posterior knee tenderness. Stability: Stable to varus, valgus. Negative Lachman testing. Negative anterior drawer, negative posterior drawer. Strength: 5/5 Q/H/TA/GS/EHL, without atrophy Neuro: Intact to light touch throughout, DTRs normal Additional Tests: Negative Sole's test, Negative patellar grind test Other findings: None.   Left Knee Exam:   Skin: Clean, dry, intact Inspection: No obvious malalignment, no masses, +swelling, no effusion Pulses: 2+ DP/PT pulses ROM: 0-135 degrees of flexion. No pain with deep knee flexion/extension. Tenderness: +MJLT. No LJLT. No pain over the patella facets. No pain to the quadriceps tendon. No pain to the patella tendon. No posterior knee tenderness. Stability: Stable to varus, valgus. Negative Lachman testing. Negative anterior drawer, negative posterior drawer. Strength: 5/5 Q/H/TA/GS/EHL, without atrophy Neuro: Intact to light touch throughout, DTRs normal Additional Tests: Negative Sole's test, Negative patellar grind test Other findings: None. [de-identified] : Images were reviewed dated 10.23.2023  4 views of the bilateral knee that show no acute fracture or dislocation. There is moderate medial, moderate lateral and mild patellofemoral degenerative changes seen. There is no significant malalignment. No significant other obvious osseous abnormality, otherwise unremarkable.   MRI left knee 1/19/23 shows complex medial meniscal tearing. Medial and patellofemoral compartment arthrosis.

## 2024-05-30 RX ORDER — HYALURONATE SODIUM 20 MG/2 ML
20 SYRINGE (ML) INTRAARTICULAR
Qty: 3 | Refills: 0 | Status: ACTIVE | COMMUNITY
Start: 2024-05-30 | End: 1900-01-01

## 2024-06-12 ENCOUNTER — APPOINTMENT (OUTPATIENT)
Dept: ORTHOPEDIC SURGERY | Facility: CLINIC | Age: 59
End: 2024-06-12
Payer: COMMERCIAL

## 2024-06-12 PROCEDURE — 20610 DRAIN/INJ JOINT/BURSA W/O US: CPT | Mod: 50

## 2024-06-20 ENCOUNTER — APPOINTMENT (OUTPATIENT)
Dept: ORTHOPEDIC SURGERY | Facility: CLINIC | Age: 59
End: 2024-06-20
Payer: COMMERCIAL

## 2024-06-20 VITALS — BODY MASS INDEX: 28.49 KG/M2 | HEIGHT: 70 IN | WEIGHT: 199 LBS

## 2024-06-20 PROCEDURE — 20610 DRAIN/INJ JOINT/BURSA W/O US: CPT | Mod: 50

## 2024-06-25 NOTE — END OF VISIT
[FreeTextEntry3] : 60 y/o male with left knee OA.  The second of three Euflexxa injections was given today under sterile conditions into the bilateral knee joint without complication. I again discussed the role of activity modification/icing following the injection to treat any local irritation from the injection.   We'll have the patient followup for the final injection next week.
118

## 2024-06-25 NOTE — PROCEDURE
[de-identified] : Injection: Left knee joint.  Indication: Osteoarthritis.  A discussion was had with the patient regarding this procedure and all questions were answered. All risks, benefits and alternatives were discussed. These included but were not limited to bleeding, infection, and allergic reaction. Alcohol was used to clean the skin, and betadine was used to sterilize and prep the area in the supero-lateral aspect of the left knee. Ethyl chloride spray was then used as a topical anesthetic. A 21-gauge needle was used to inject 2 cc of Euflexxa into the knee. A sterile bandage was then applied. The patient tolerated the procedure well and there were no complications.  Injection: Right knee joint.  Indication: Osteoarthritis.   A discussion was had with the patient regarding this procedure and all questions were answered. All risks, benefits and alternatives were discussed. These included but were not limited to bleeding, infection, and allergic reaction. Alcohol was used to clean the skin, and betadine was used to sterilize and prep the area in the supero-lateral aspect of the right knee. Ethyl chloride spray was then used as a topical anesthetic. A 21-gauge needle was used to inject 2 cc of Euflexxa into the knee. A sterile bandage was then applied. The patient tolerated the procedure well and there were no complications.

## 2024-06-27 NOTE — PROCEDURE
[de-identified] : Injection: Left knee joint.  Indication: Osteoarthritis.  A discussion was had with the patient regarding this procedure and all questions were answered. All risks, benefits and alternatives were discussed. These included but were not limited to bleeding, infection, and allergic reaction. Alcohol was used to clean the skin, and betadine was used to sterilize and prep the area in the supero-lateral aspect of the left knee. Ethyl chloride spray was then used as a topical anesthetic. A 21-gauge needle was used to inject 2 cc of Euflexxa into the knee. A sterile bandage was then applied. The patient tolerated the procedure well and there were no complications.  Injection: Right knee joint.  Indication: Osteoarthritis.   A discussion was had with the patient regarding this procedure and all questions were answered. All risks, benefits and alternatives were discussed. These included but were not limited to bleeding, infection, and allergic reaction. Alcohol was used to clean the skin, and betadine was used to sterilize and prep the area in the supero-lateral aspect of the right knee. Ethyl chloride spray was then used as a topical anesthetic. A 21-gauge needle was used to inject 2 cc of Euflexxa into the knee. A sterile bandage was then applied. The patient tolerated the procedure well and there were no complications.

## 2024-06-27 NOTE — ADDENDUM
[FreeTextEntry1] : This note was written by Lynda Harris on 06/12/2024 acting solely as a scribe for Dr. Milton Kenny.  All medical record entries made by the Scribe were at my, Dr. Milton Kenny, direction and personally dictated by me on 06/12/2024. I have personally reviewed the chart and agree that the record accurately reflects my personal performance of the history, physical exam, assessment and plan.

## 2024-06-27 NOTE — END OF VISIT
[FreeTextEntry3] : 60 y/o male with left knee OA.  The first of three Euflexxa injections was given today under sterile conditions into the bilateral knee joint without complication. The patient was instructed on modification of activities over the next 48-72 hours. I advised the patient to ice the knee as needed for control of local irritation from the injection. I advised that some patients have immediate benefit from the initial injection therapy, however it usually takes the medication a number of weeks (~8wks) to provide significant relief of osteoarthritic symptoms.   We will see the patient back for the second injection in a weeks time.

## 2024-07-01 ENCOUNTER — APPOINTMENT (OUTPATIENT)
Dept: ORTHOPEDIC SURGERY | Facility: CLINIC | Age: 59
End: 2024-07-01
Payer: COMMERCIAL

## 2024-07-01 DIAGNOSIS — M17.12 UNILATERAL PRIMARY OSTEOARTHRITIS, LEFT KNEE: ICD-10-CM

## 2024-07-01 DIAGNOSIS — M17.11 UNILATERAL PRIMARY OSTEOARTHRITIS, RIGHT KNEE: ICD-10-CM

## 2024-07-01 PROCEDURE — 20610 DRAIN/INJ JOINT/BURSA W/O US: CPT | Mod: 50

## 2024-07-09 ENCOUNTER — APPOINTMENT (OUTPATIENT)
Dept: ORTHOPEDIC SURGERY | Facility: CLINIC | Age: 59
End: 2024-07-09
Payer: COMMERCIAL

## 2024-07-09 VITALS — WEIGHT: 199 LBS | BODY MASS INDEX: 28.49 KG/M2 | HEIGHT: 70 IN

## 2024-07-09 DIAGNOSIS — M77.51 OTHER ENTHESOPATHY OF RT FOOT AND ANKLE: ICD-10-CM

## 2024-07-09 PROCEDURE — 99214 OFFICE O/P EST MOD 30 MIN: CPT

## 2024-07-09 PROCEDURE — 73630 X-RAY EXAM OF FOOT: CPT | Mod: RT

## 2024-09-03 ENCOUNTER — APPOINTMENT (OUTPATIENT)
Dept: ORTHOPEDIC SURGERY | Facility: CLINIC | Age: 59
End: 2024-09-03
Payer: COMMERCIAL

## 2024-09-03 DIAGNOSIS — M77.51 OTHER ENTHESOPATHY OF RT FOOT AND ANKLE: ICD-10-CM

## 2024-09-03 PROCEDURE — 99213 OFFICE O/P EST LOW 20 MIN: CPT

## 2024-09-03 NOTE — DISCUSSION/SUMMARY
[de-identified] : Options reviewed, [place] shoe wear, activity modification, HEP.  Return to office in 2 - 3 months / PRN, all questions answered.

## 2024-09-03 NOTE — PHYSICAL EXAM
[Normal] : Oriented to person, place, and time, insight and judgement were intact and the affect was normal [de-identified] : Extremity: +equinus (releases) R LE, interval decreased bursal thickening / soft tissue swelling and resolving tenderness plantar medial aspect of first MTP joint R hallux, stable Drawer testing first MTP joint R forefoot, EHL / FHL intact R foot. Nontender R ankle, peroneals, syndesmosis, Achilles, hindfoot ST, midfoot LF and PTT insertional, and remainder of forefoot. Calves soft and nontender, sensorimotor unchanged, skin intact as aforementioned R LE.  AOx3, mood / affect normal. [de-identified] : JONO, NAD

## 2024-09-03 NOTE — HISTORY OF PRESENT ILLNESS
[Other: ____] : [unfilled] [FreeTextEntry1] : Follow-up bursitis R hallux for approximately 5 months, reports doing well since last visit.